# Patient Record
Sex: MALE | Race: ASIAN | Employment: UNEMPLOYED | URBAN - METROPOLITAN AREA
[De-identification: names, ages, dates, MRNs, and addresses within clinical notes are randomized per-mention and may not be internally consistent; named-entity substitution may affect disease eponyms.]

---

## 2018-09-18 ENCOUNTER — THERAPY VISIT (OUTPATIENT)
Dept: PHYSICAL THERAPY | Facility: CLINIC | Age: 58
End: 2018-09-18
Payer: COMMERCIAL

## 2018-09-18 DIAGNOSIS — R42 VERTIGO: Primary | ICD-10-CM

## 2018-09-18 PROCEDURE — 97161 PT EVAL LOW COMPLEX 20 MIN: CPT | Mod: GP | Performed by: PHYSICAL THERAPIST

## 2018-09-18 NOTE — PROGRESS NOTES
"[facility] Initial Evaluation      Subjective:  Patient is a 58 year old male presenting with rehab cervical spine hpi. The history is provided by the patient.   Affected Side: head.  Condition occurred with:  Insidious onset.  Condition occurred: for unknown reasons.  This is a new condition  SUBJECTIVE:  Nida is a 58 year old patient complaining of dizziness, disorientation; \"not so much dizziness, more like after drinking alcohol\".  Onset of symptoms: 7/25/18      Is this a recurrent problem: no  Progression since onset: better  Frequency of episodes/time of day: for last 2 wks, no symptoms  Duration of episodes: 30 sec to several minutes  Exacerbating factors: turning head quickly, getting up from sitting, turning to either side  Relieving factors: time, Meclizine (but none in last 2 days)  Previous treatments:  none  Special tests/diagnostics performed: none  Significant medical hx: high cholesterol  Meds: none  Occupation:    Work requirements: computer work, driving, prolonged sitting  General health reported by patient: good  Red Flags: none      OBJECTIVE:  Cervical ROM screen: WFLs, no pain or dizziness  Oculomotor/gaze stability screen: N/A  Vertebral artery test: negative to both sides  Hallpike-[place] maneuver:  R: negative for nystagmus or dizziness  L: negative for both nystagmus and dizziness  Horizontal canal test:  R: negative  L: negative  Balance testing:  NA.         and is intermittent   Associated symptoms:  Dizziness and fatigue. Pain is the same all the time (dizziness).  Symptoms are exacerbated by change of position and rotating head and relieved by other (waiting it out).  Since onset symptoms are rapidly improving.        General health as reported by patient is good.  Pertinent medical history includes:  None.  Medical allergies: no.  Other surgeries include:  No.  Current medications:  None as reported by the patient.          Barriers include:  None " as reported by the patient.    Red flags:  None as reported by the patient.                        Objective:  System    Physical Exam    General     ROS    Assessment/Plan:    Patient is a 58 year old male with dizziness complaints.    Patient has the following significant findings with corresponding treatment plan.                Diagnosis 1:  dizziness  Decreased function - home program    Therapy Evaluation Codes:   1) History comprised of:   Personal factors that impact the plan of care:      Time since onset of symptoms.    Comorbidity factors that impact the plan of care are:      None.     Medications impacting care: None.  2) Examination of Body Systems comprised of:   Body structures and functions that impact the plan of care:      Cervical spine.   Activity limitations that impact the plan of care are:      none currently.  3) Clinical presentation characteristics are:   Stable/Uncomplicated.  4) Decision-Making    Low complexity using standardized patient assessment instrument and/or measureable assessment of functional outcome.  Cumulative Therapy Evaluation is: Low complexity.    Previous and current functional limitations:  (See Goal Flow Sheet for this information)    Short term and Long term goals: (See Goal Flow Sheet for this information)     Communication ability:  Patient appears to be able to clearly communicate and understand verbal and written communication and follow directions correctly.  Treatment Explanation - The following has been discussed with the patient:   RX ordered/plan of care  Anticipated outcomes  Possible risks and side effects  PT intervention is not appropriate at this time.   Rehab potential is not applicable as no further treatment is needed.    Frequency:  Today only  Duration:  Today only  Discharge Plan:  Discharge today but instructed patient to call if he has a return of his dizziness and we will give him the number for the [facility] where they can perform a  more thorough and inclusive testing to determine the cause of his dizziness.    Please refer to the daily flowsheet for treatment today, total treatment time and time spent performing 1:1 timed codes.

## 2018-09-18 NOTE — MR AVS SNAPSHOT
After Visit Summary   9/18/2018    Nida Yost    MRN: [id number]           Patient Information     Date Of Birth          1960        Visit Information        Provider Department      9/18/2018 10:10 AM Polina De Jesus, PT [facility]        Today's Diagnoses     Vertigo    -  1       Follow-ups after your visit        Who to contact     If you have questions or need follow up information about today's clinic visit or your schedule please contact [facility] directly at [phone].  Normal or non-critical lab and imaging results will be communicated to you by MyChart, letter or phone within 4 business days after the clinic has received the results. If you do not hear from us within 7 days, please contact the clinic through MyChart or phone. If you have a critical or abnormal lab result, we will notify you by phone as soon as possible.  Submit refill requests through [employer] or call your pharmacy and they will forward the refill request to us. Please allow 3 business days for your refill to be completed.          Additional Information About Your Visit        Care EveryWhere ID     This is your Care EveryWhere ID. This could be used by other organizations to access your [facility] medical records  [id number]         Blood Pressure from Last 3 Encounters:   No data found for BP    Weight from Last 3 Encounters:   No data found for Wt              We Performed the Following     HC PT EVAL, LOW COMPLEXITY     [facility] INITIAL EVAL REPORT        Primary Care Provider Office Phone # Fax #    Carmel Ramirez [phone] [address] [facility] [address]        Equal Access to Services     YADI NICOLE : Hadii aad ku hadasho Somaryamali, waaxda luqadaha, qaybta kaalmada aarti, mumtaz lim . So [facility] [phone].    ATENCIÓN: Si chrisla deann, tiene a chavez disposición servicios  kelsea de asistencia lingüística. Rolando rodriguez [phone].    We comply with applicable federal civil rights laws and [place] laws. We do not discriminate on the basis of race, color, national origin, age, disability, sex, sexual orientation, or gender identity.            Thank you!     Thank you for choosing [facility]  for your care. Our goal is always to provide you with excellent care. Hearing back from our patients is one way we can continue to improve our services. Please take a few minutes to complete the written survey that you may receive in the mail after your visit with us. Thank you!             Your Updated Medication List - Protect others around you: Learn how to safely use, store and throw away your medicines at www.disposemymeds.org.      Notice  As of 9/18/2018  4:27 PM    You have not been prescribed any medications.

## 2018-09-18 NOTE — LETTER
"[facility]  [address]  [facility] [address]  [phone]    2018    Re: Nida Yost   :   1960  MRN:  [id number]   REFERRING PHYSICIAN:   Carmel Ramirez  [facility]  Date of Initial Evaluation:2018  Visits:  Rxs Used: 1  Reason for Referral:  Vertigo    EVALUATION SUMMARY  [facility] Initial Evaluation  Subjective:  Patient is a 58 year old male presenting with rehab cervical spine hpi. The history is provided by the patient.   Affected Side: head.  Condition occurred with:  Insidious onset.  Condition occurred: for unknown reasons.  This is a new condition  SUBJECTIVE:  Nida is a 58 year old patient complaining of dizziness, disorientation; \"not so much dizziness, more like after drinking alcohol\".  Onset of symptoms: 18      Is this a recurrent problem: no  Progression since onset: better  Frequency of episodes/time of day: for last 2 wks, no symptoms  Duration of episodes: 30 sec to several minutes  Exacerbating factors: turning head quickly, getting up from sitting, turning to either side  Relieving factors: time, Meclizine (but none in last 2 days)  Previous treatments:  none  Special tests/diagnostics performed: none  Significant medical hx: high cholesterol  Meds: none  Occupation:    Work requirements: computer work, driving, prolonged sitting  General health reported by patient: good  Red Flags: none    OBJECTIVE:  Cervical ROM screen: WFLs, no pain or dizziness  Oculomotor/gaze stability screen: N/A  Vertebral artery test: negative to both sides  Hallpike-[place] maneuver:  R: negative for nystagmus or dizziness  L: negative for both nystagmus and dizziness  Horizontal canal test:  R: negative  L: negative  Balance testing:  NA.         and is intermittent   Associated symptoms:  Dizziness and fatigue. Pain is the same all the time (dizziness).  Symptoms " are exacerbated by change of position and rotating head and relieved by other (waiting it out).  Since onset symptoms are rapidly improving.        General health as reported by patient is good.  Pertinent medical history includes:  None.  Medical allergies: no.  Other surgeries include:  No.  Current medications:  None as reported by the patient.        Barriers include:  None as reported by the patient.  Red flags:  None as reported by the patient.    Assessment/Plan:    Patient is a 58 year old male with dizziness complaints.    Patient has the following significant findings with corresponding treatment plan.                Diagnosis 1:  dizziness  Decreased function - home program    Therapy Evaluation Codes:   1) History comprised of:   Personal factors that impact the plan of care:      Time since onset of symptoms.    Comorbidity factors that impact the plan of care are:      None.     Medications impacting care: None.  2) Examination of Body Systems comprised of:   Body structures and functions that impact the plan of care:      Cervical spine.   Activity limitations that impact the plan of care are:      none currently.  3) Clinical presentation characteristics are:   Stable/Uncomplicated.  4) Decision-Making    Low complexity using standardized patient assessment instrument and/or measureable assessment of functional outcome.  Cumulative Therapy Evaluation is: Low complexity.    Previous and current functional limitations:  (See Goal Flow Sheet for this information)    Short term and Long term goals: (See Goal Flow Sheet for this information)     Communication ability:  Patient appears to be able to clearly communicate and understand verbal and written communication and follow directions correctly.  Treatment Explanation - The following has been discussed with the patient:   RX ordered/plan of care  Anticipated outcomes  Possible risks and side effects  PT intervention is not appropriate at this time.    Rehab potential is not applicable as no further treatment is needed.    Frequency:  Today only  Duration:  Today only  Discharge Plan:  Discharge today but instructed patient to call if he has a return of his dizziness and we will give him the number for the  [facility]hab[facility] where they can perform a more thorough and inclusive testing to determine the cause of his dizziness.    Please refer to the daily flowsheet for treatment today, total treatment time and time spent performing 1:1 timed codes.     Thank you for your referral.    INQUIRIES  Therapist: Polina De Jesus [facility]  [facility]  [address]  [facility] [place] [address]  Phone: [phone]  Fax: [phone]

## 2020-11-20 ENCOUNTER — TRANSCRIBE ORDERS (OUTPATIENT)
Dept: OTHER | Age: 60
End: 2020-11-20

## 2020-11-20 DIAGNOSIS — R97.20 ELEVATED PROSTATE SPECIFIC ANTIGEN (PSA): Primary | ICD-10-CM

## 2021-03-12 ENCOUNTER — PRE VISIT (OUTPATIENT)
Dept: NEUROLOGY | Facility: CLINIC | Age: 61
End: 2021-03-12

## 2021-03-12 NOTE — TELEPHONE ENCOUNTER
FUTURE VISIT INFORMATION      FUTURE VISIT INFORMATION:    Date: 3/16/2021    Time: 930am    Location: [facility]  REFERRAL INFORMATION:    Referring provider:  Self     Referring providers clinic:      Reason for visit/diagnosis  Dizziness     RECORDS REQUESTED FROM:       Clinic name Comments Records Status Imaging Status   Internal ALBERT Maldonado-9/28/2020 Epic N/A

## 2021-03-16 ENCOUNTER — VIRTUAL VISIT (OUTPATIENT)
Dept: NEUROLOGY | Facility: CLINIC | Age: 61
End: 2021-03-16
Payer: COMMERCIAL

## 2021-03-16 DIAGNOSIS — R42 DIZZINESS: Primary | ICD-10-CM

## 2021-03-16 PROCEDURE — 99202 OFFICE O/P NEW SF 15 MIN: CPT | Mod: 95 | Performed by: PSYCHIATRY & NEUROLOGY

## 2021-03-16 RX ORDER — TAMSULOSIN HYDROCHLORIDE 0.4 MG/1
0.4 CAPSULE ORAL
COMMUNITY
Start: 2020-10-19

## 2021-03-16 RX ORDER — ATORVASTATIN CALCIUM 20 MG/1
20 TABLET, FILM COATED ORAL
COMMUNITY
Start: 2020-10-19

## 2021-03-16 RX ORDER — AMLODIPINE BESYLATE 5 MG/1
5 TABLET ORAL
COMMUNITY
Start: 2020-12-28

## 2021-03-16 NOTE — LETTER
3/16/2021       RE: Nida Yost  [address]  [place] [address]     Dear Colleague,    Thank you for referring your patient, Nida Yost, to the [facility]. Please see a copy of my visit note below.    Nida is a 60 year old who is being evaluated via a billable video visit.      How would you like to obtain your AVS? [employer]  If the video visit is dropped, the invitation should be resent by: [phone]  Will anyone else be joining your video visit? NO    Video-Visit Details    Type of service:  Video Visit x 40 min        Distant Location (provider location):  [facility]UROLOG[facility]     Platform used for Video Visit: [employer]    Service Date: 2021      Carmel Ramirez MD   [facility]   [address] [place]  [address]      RE: Nida Yost   MRN: [id number]   : 1960      Dear Dr. Ramirez:      We did a video visit over 40 minutes with Mr. Nida Yost, a 60-year-old  American, who complained of dizziness and was referred for evaluation.  Mr. Yost reports that since August of last year he has had this sensation of a dizzy feeling or imbalance like he has had a drink and he says his head feels somewhat heavy at times.  He says this is totally positional, and when he gets up and moves or puts his head lower than the rest of the body, he will explain this sensation of wooziness.  There is no vertigo or spinning.  No numbness, tingling or other neurological symptoms with this brief phenomenon.      He does have some mild hearing loss by testing in both ears.      He will be undergoing surgery at [facility] on  for high PSA and apparently [place] prostate score of 8 or 9.       PAST MEDICAL HISTORY:  He has not had any history of any neurological problem or medically.  He has been very healthy.  He does have mild hypertension for which  he is on Norvasc 5 mg a day and he is also on atorvastatin.      There has not been any COVID or any problems on his review of systems.  He has some mild pain and stiffness in the left side of the neck.      ALLERGIES:  Pepper with GI disturbance or hot pepper disturbance.       FAMILY HISTORY:  Noncontributory.  He has no heart symptoms and no heart disease.      PHYSICAL EXAMINATION:  He is a very pleasant man.  His mental status exam is normal.  Cranial nerve exam is normal except he has mild difficulty pushing the tongue inside his cheek on the right, but there is no numbness, loss of taste or other cranial nerve findings.  His hearing is fine.  Motions of the head did not produce any sensation of dizziness or the like.  His gait and station are normal.  He can do tandem walk.  His coordination is good.  He is right handed.      In summary, he has some positional dizziness, which is there for about a year, and a weird sensation in his head.  He is going to undergo prostatectomy with a high PSA and would need at least an MRI prior to the surgery at [facility] for any abnormalities.  He does have some mild hypertension, but apparently, his reading has been good.      We will check the MRI and try to get him ready for surgery .      Sincerely,      Carmelo Skinner MD           D: 2021   T: 2021   MT: prince      Name:     JADE DAVIS   MRN:      [id number]        Account:      [id number]   :      1960           Service Date: 2021      Document: [id number]

## 2021-03-16 NOTE — PROGRESS NOTES
Service Date: 2021      Carmel Ramirez MD   [facility]   [address] [place]  [address]      RE: Nida Yost   MRN: [id number]   : 1960      Dear Dr. Ramirez:      We did a video visit over 40 minutes with Mr. Nida Yost, a 60-year-old  American, who complained of dizziness and was referred for evaluation.  Mr. Yost reports that since August of last year he has had this sensation of a dizzy feeling or imbalance like he has had a drink and he says his head feels somewhat heavy at times.  He says this is totally positional, and when he gets up and moves or puts his head lower than the rest of the body, he will explain this sensation of wooziness.  There is no vertigo or spinning.  No numbness, tingling or other neurological symptoms with this brief phenomenon.      He does have some mild hearing loss by testing in both ears.      He will be undergoing surgery at [facility] on  for high PSA and apparently [place] prostate score of 8 or 9.       PAST MEDICAL HISTORY:  He has not had any history of any neurological problem or medically.  He has been very healthy.  He does have mild hypertension for which he is on Norvasc 5 mg a day and he is also on atorvastatin.      There has not been any COVID or any problems on his review of systems.  He has some mild pain and stiffness in the left side of the neck.      ALLERGIES:  Pepper with GI disturbance or hot pepper disturbance.       FAMILY HISTORY:  Noncontributory.  He has no heart symptoms and no heart disease.      PHYSICAL EXAMINATION:  He is a very pleasant man.  His mental status exam is normal.  Cranial nerve exam is normal except he has mild difficulty pushing the tongue inside his cheek on the right, but there is no numbness, loss of taste or other cranial nerve findings.  His hearing is fine.  Motions of the head did not produce any sensation of dizziness or the like.  His gait and station are normal.  He  can do tandem walk.  His coordination is good.  He is right handed.      In summary, he has some positional dizziness, which is there for about a year, and a weird sensation in his head.  He is going to undergo prostatectomy with a high PSA and would need at least an MRI prior to the surgery at [place] for any abnormalities.  He does have some mild hypertension, but apparently, his reading has been good.      We will check the MRI and try to get him ready for surgery .      Sincerely,      MD MUMTAZ Bertrand MD             D: 2021   T: 2021   MT: prince      Name:     JADE DAVIS   MRN:      [id number]        Account:      [id number]   :      1960           Service Date: 2021      Document: [id number]

## 2021-03-16 NOTE — PROGRESS NOTES
Nida is a 60 year old who is being evaluated via a billable video visit.      How would you like to obtain your AVS? [employer]  If the video visit is dropped, the invitation should be resent by: [phone]  Will anyone else be joining your video visit? NO    Video-Visit Details    Type of service:  Video Visit x 40 min        Distant Location (provider location):  [facility] NEUROLOGY [facility]     Platform used for Video Visit: [employer]

## 2021-03-28 ENCOUNTER — HEALTH MAINTENANCE LETTER (OUTPATIENT)
Age: 61
End: 2021-03-28

## 2021-09-11 ENCOUNTER — HEALTH MAINTENANCE LETTER (OUTPATIENT)
Age: 61
End: 2021-09-11

## 2022-04-23 ENCOUNTER — HEALTH MAINTENANCE LETTER (OUTPATIENT)
Age: 62
End: 2022-04-23

## 2022-10-30 ENCOUNTER — HEALTH MAINTENANCE LETTER (OUTPATIENT)
Age: 62
End: 2022-10-30

## 2023-06-01 ENCOUNTER — HEALTH MAINTENANCE LETTER (OUTPATIENT)
Age: 63
End: 2023-06-01